# Patient Record
Sex: FEMALE | NOT HISPANIC OR LATINO | Employment: UNEMPLOYED | URBAN - METROPOLITAN AREA
[De-identification: names, ages, dates, MRNs, and addresses within clinical notes are randomized per-mention and may not be internally consistent; named-entity substitution may affect disease eponyms.]

---

## 2025-05-09 ENCOUNTER — APPOINTMENT (OUTPATIENT)
Dept: RADIOLOGY | Facility: HOSPITAL | Age: 60
End: 2025-05-09
Payer: COMMERCIAL

## 2025-05-09 ENCOUNTER — HOSPITAL ENCOUNTER (EMERGENCY)
Facility: HOSPITAL | Age: 60
Discharge: HOME | End: 2025-05-09
Attending: STUDENT IN AN ORGANIZED HEALTH CARE EDUCATION/TRAINING PROGRAM
Payer: COMMERCIAL

## 2025-05-09 VITALS
DIASTOLIC BLOOD PRESSURE: 93 MMHG | TEMPERATURE: 97.9 F | SYSTOLIC BLOOD PRESSURE: 165 MMHG | HEART RATE: 84 BPM | BODY MASS INDEX: 38.64 KG/M2 | RESPIRATION RATE: 14 BRPM | OXYGEN SATURATION: 99 % | WEIGHT: 210 LBS | HEIGHT: 62 IN

## 2025-05-09 DIAGNOSIS — S82.002A CLOSED NONDISPLACED FRACTURE OF LEFT PATELLA, UNSPECIFIED FRACTURE MORPHOLOGY, INITIAL ENCOUNTER: Primary | ICD-10-CM

## 2025-05-09 PROCEDURE — 73502 X-RAY EXAM HIP UNI 2-3 VIEWS: CPT | Mod: RT

## 2025-05-09 PROCEDURE — 73502 X-RAY EXAM HIP UNI 2-3 VIEWS: CPT | Mod: RIGHT SIDE | Performed by: RADIOLOGY

## 2025-05-09 PROCEDURE — 73560 X-RAY EXAM OF KNEE 1 OR 2: CPT | Mod: LT

## 2025-05-09 PROCEDURE — 73560 X-RAY EXAM OF KNEE 1 OR 2: CPT | Mod: RIGHT SIDE | Performed by: RADIOLOGY

## 2025-05-09 PROCEDURE — 2500000001 HC RX 250 WO HCPCS SELF ADMINISTERED DRUGS (ALT 637 FOR MEDICARE OP)

## 2025-05-09 PROCEDURE — 99284 EMERGENCY DEPT VISIT MOD MDM: CPT | Performed by: STUDENT IN AN ORGANIZED HEALTH CARE EDUCATION/TRAINING PROGRAM

## 2025-05-09 PROCEDURE — 73560 X-RAY EXAM OF KNEE 1 OR 2: CPT | Mod: RT

## 2025-05-09 RX ORDER — PANTOPRAZOLE SODIUM 40 MG/1
40 TABLET, DELAYED RELEASE ORAL
COMMUNITY

## 2025-05-09 RX ORDER — CEFADROXIL 500 MG/1
500 CAPSULE ORAL
COMMUNITY

## 2025-05-09 RX ORDER — POLYETHYLENE GLYCOL 3350 17 G/17G
17 POWDER, FOR SOLUTION ORAL DAILY
COMMUNITY

## 2025-05-09 RX ORDER — CITALOPRAM 20 MG/1
20 TABLET, FILM COATED ORAL DAILY
COMMUNITY

## 2025-05-09 RX ORDER — GABAPENTIN 300 MG/1
300 CAPSULE ORAL 2 TIMES DAILY
COMMUNITY

## 2025-05-09 RX ORDER — HYDROCODONE BITARTRATE AND ACETAMINOPHEN 10; 325 MG/1; MG/1
1 TABLET ORAL ONCE
Refills: 0 | Status: COMPLETED | OUTPATIENT
Start: 2025-05-09 | End: 2025-05-09

## 2025-05-09 RX ORDER — DULOXETIN HYDROCHLORIDE 30 MG/1
30 CAPSULE, DELAYED RELEASE ORAL DAILY
COMMUNITY

## 2025-05-09 RX ORDER — CELECOXIB 200 MG/1
200 CAPSULE ORAL 2 TIMES DAILY
COMMUNITY

## 2025-05-09 RX ORDER — DOCUSATE SODIUM 100 MG/1
100 CAPSULE, LIQUID FILLED ORAL DAILY
COMMUNITY

## 2025-05-09 RX ORDER — AMLODIPINE BESYLATE 5 MG/1
5 TABLET ORAL DAILY
COMMUNITY

## 2025-05-09 RX ORDER — VALSARTAN AND HYDROCHLOROTHIAZIDE 160; 25 MG/1; MG/1
1 TABLET ORAL DAILY
COMMUNITY

## 2025-05-09 RX ORDER — OXYCODONE HYDROCHLORIDE 5 MG/1
5 TABLET ORAL EVERY 6 HOURS PRN
COMMUNITY

## 2025-05-09 RX ORDER — VALSARTAN 80 MG/1
80 TABLET ORAL DAILY
COMMUNITY
End: 2025-05-09 | Stop reason: ENTERED-IN-ERROR

## 2025-05-09 RX ORDER — ASPIRIN 81 MG/1
81 TABLET ORAL DAILY
COMMUNITY

## 2025-05-09 RX ORDER — OXYCODONE AND ACETAMINOPHEN 5; 325 MG/1; MG/1
1-2 TABLET ORAL EVERY 6 HOURS PRN
Qty: 15 TABLET | Refills: 0 | Status: SHIPPED | OUTPATIENT
Start: 2025-05-09

## 2025-05-09 RX ORDER — MORPHINE SULFATE 4 MG/ML
4 INJECTION INTRAVENOUS ONCE
Status: DISCONTINUED | OUTPATIENT
Start: 2025-05-09 | End: 2025-05-09

## 2025-05-09 RX ADMIN — HYDROCODONE BITARTRATE AND ACETAMINOPHEN 1 TABLET: 10; 325 TABLET ORAL at 17:03

## 2025-05-09 ASSESSMENT — LIFESTYLE VARIABLES
HAVE YOU EVER FELT YOU SHOULD CUT DOWN ON YOUR DRINKING: NO
HAVE PEOPLE ANNOYED YOU BY CRITICIZING YOUR DRINKING: NO
EVER HAD A DRINK FIRST THING IN THE MORNING TO STEADY YOUR NERVES TO GET RID OF A HANGOVER: NO
EVER FELT BAD OR GUILTY ABOUT YOUR DRINKING: NO
TOTAL SCORE: 0

## 2025-05-09 ASSESSMENT — PAIN - FUNCTIONAL ASSESSMENT: PAIN_FUNCTIONAL_ASSESSMENT: 0-10

## 2025-05-09 ASSESSMENT — PAIN SCALES - GENERAL: PAINLEVEL_OUTOF10: 2

## 2025-05-09 ASSESSMENT — COLUMBIA-SUICIDE SEVERITY RATING SCALE - C-SSRS
2. HAVE YOU ACTUALLY HAD ANY THOUGHTS OF KILLING YOURSELF?: NO
6. HAVE YOU EVER DONE ANYTHING, STARTED TO DO ANYTHING, OR PREPARED TO DO ANYTHING TO END YOUR LIFE?: NO
1. IN THE PAST MONTH, HAVE YOU WISHED YOU WERE DEAD OR WISHED YOU COULD GO TO SLEEP AND NOT WAKE UP?: NO

## 2025-05-09 ASSESSMENT — PAIN DESCRIPTION - PAIN TYPE: TYPE: ACUTE PAIN

## 2025-05-09 ASSESSMENT — PAIN DESCRIPTION - LOCATION: LOCATION: KNEE

## 2025-05-09 NOTE — PROGRESS NOTES
Fina Elliott is a 59 y.o. female admitted for No Principal Problem: There is no principal problem currently on the Problem List. Please update the Problem List and refresh.. Pharmacy reviewed the patient's [id number] medications and allergies for accuracy.    The list below reflects the PTA list prior to pharmacy medication history. A summary a changes to the PTA medication list has been listed below. Please review each medication in order reconciliation for additional clarification and justification.    Source of information:  T2P    Medications added:  ADDED ALL MEDICATIONS    Medications modified:    Medications to be removed:    Medications of concern:      None       Charla Patel

## 2025-05-09 NOTE — DISCHARGE INSTRUCTIONS
Please call Monday morning and expediently follow-up with [facility] for further treatment and evaluation, please ensure the use of the knee immobilizer and your assistive device

## 2025-05-09 NOTE — ED PROVIDER NOTES
Chief Complaint   Patient presents with    left knee pain-replacment in 2019       59-year-old female arrives to the emergency department with a chief complaint of left knee pain with recent right knee replacement.  Patient had left knee replaced in 2019, 4 days ago had the right knee replaced at [facility].  The patient was attempting to ambulate at 2 AM this morning using her walker when spontaneously her left knee went out in her words and the patient fell backwards onto her right hip.  The patient states that since that time she is not able to bear weight on either the right knee with total replacement or the left knee after it has given out.  At rest the patient is in no apparent distress or obvious pain, however with any attempts at weightbearing the pain is unbearable.  Patient is neurovascular intact in bilateral lower extremity, there is postsurgical soft tissue swelling with surgical dressing intact in the right knee.  Bruising and swelling appreciated around the left knee, pain with palpation and range of motion.  Patient also has pain on palpation of the right hip, she does not know if it is from the fall or her favoring it after her right knee replacement.      History provided by:  Patient   used: No         PmHx, PsHx, Allergies, Family Hx, social Hx reviewed as documented    Given the focused nature of the complaint, only related components review of systems were evaluated, and abnormalities indicated in HPI    Physical Exam:    General: Patient is AAOx3, appears well developed, well nourished, is a good historian, answers questions appropriately    HEENT: head normocephalic, atraumatic, PERRLA, EOMs intact, oropharynx without erythema or exudate, buccal mucosa intact without lesions, TMs unremarkable, nose is patent bilateral    Pulmonary: CTAB, no accessory muscle use, able to speak full clear sentences    Cardiac: HRRR, no murmurs, rubs or gallops    GI: soft, non-tender,  non-distended    Musculoskeletal: Postsurgical pain in right knee, acute pain of left knee which is also a total replacement per HPI, pain on palpation with weightbearing of right hip per HPI.  Patient unable to bear weight or ambulate secondary to pain even with assistive device, MOSQUEDA, no joint effusions, clubbing or edema noted    Skin: Postsurgical swelling right knee, soft tissue swelling around left knee per HPI.  Otherwise skin intact, no lesions or rashes noted, turgor is good.    Medical Decision Making  This patient was seen in the emergency department with an attending physician available at all times throughout their ED course    Primary consideration for this patient would be the inability to ambulate, underlying dislocation and/or fracture of the left knee which was a total arthroplasty from 6 years ago approximately, complications secondary to the fall for underlying hip fracture versus contusion, or postsurgical complications of the right knee after the fall.  Bilateral x-rays of the knees as well as right hip will be used to further evaluate, patient denies need for any analgesic at this time.    The patient states that her pain has increased since her stay in the emergency department, patient given Vicodin 10-3 25    The patient's x-ray shows an acute and comminuted displaced fracture of the patella that is acute.  The x-ray of the right hip is negative for any acute abnormality, the x-ray of the right knee shows no acute abnormality, showing gas in the soft tissues likely from the recent surgery.    I consulted Dr. Silva, with on-call orthopedics, he recommended that the patient be placed in a knee immobilizer, ambulated with walker and discharged for outpatient with her established orthopedic at [facility].    The patient is amenable with the plan of discharge, although was offered an admission for placement in a rehab facility, the primary nurse placed the patient in a knee immobilizer on the  left side, the patient able to ambulate slowly with the use of a walker and is requesting to be discharged home and will follow with her established orthopedic physician and lieu of a placement to a rehab facility for inability to ambulate.  I personally reevaluated the patient after the knee immobilizer, the patient was found to be neurovascularly intact distal to the immobilizer.    Patient is nearly out of her Vicodin at home, patient given Percocet for her new fracture of the left patella.    Patient is amenable to the plan of discharge as outlined above, all patient's questions pertaining to their ED course were answered in their entirety.  Strict return precautions were discussed with the patient and they verbalized understanding.  Further, it was made clear to the patient that from an emergent basis, all effort and testing was done to eliminate any imminent dangerous or potentially dangerous conditions of the patient however if their symptoms get much worse or feel life-threatening, they are to return to the emergency department or call 911 immediately.    Amount and/or Complexity of Data Reviewed  Radiology: ordered. Decision-making details documented in ED Course.       Diagnoses as of 05/09/25 1841   Closed nondisplaced fracture of left patella, unspecified fracture morphology, initial encounter       The patient has had the following imaging during this ER visit: XR KNEE RIGHT 1-2 VIEWS  XR KNEE LEFT 1-2 VIEWS  XR HIP RIGHT WITH PELVIS WHEN PERFORMED 2 OR 3 VIEWS     Patient History   Medical History[1]  Surgical History[2]  Family History[3]  Social History[4]    ED Triage Vitals [05/09/25 1426]   Temperature Heart Rate Respirations BP   36.6 °C (97.9 °F) 84 14 (!) 165/93      Pulse Ox Temp src Heart Rate Source Patient Position   99 % -- -- --      BP Location FiO2 (%)     -- --       Vitals:    05/09/25 1426   BP: (!) 165/93   Pulse: 84   Resp: 14   Temp: 36.6 °C (97.9 °F)   SpO2: 99%   Weight: 95.3 kg  "(210 lb)   Height: 1.575 m (5' 2\")               Marvel Kelly, APRN-CNP  05/09/25 1840         [1]   Past Medical History:  Diagnosis Date    Other specified disorders of adrenal gland (Multi) 11/09/2021    Left adrenal mass    Personal history of other diseases of the respiratory system 10/19/2015    History of acute bronchitis   [2]   Past Surgical History:  Procedure Laterality Date    BLADDER SURGERY  11/06/2013    Bladder Surgery    OTHER SURGICAL HISTORY  05/05/2021    Achilles tendon surgery    OTHER SURGICAL HISTORY  05/05/2021    Knee replacement    ROTATOR CUFF REPAIR  11/06/2013    Rotator Cuff Repair    TOTAL ABDOMINAL HYSTERECTOMY W/ BILATERAL SALPINGOOPHORECTOMY  11/06/2013    Total Abdominal Hysterectomy With Removal Of Both Ovaries   [3] No family history on file.  [4]   Social History  Tobacco Use    Smoking status: Not on file    Smokeless tobacco: Not on file   Substance Use Topics    Alcohol use: Not on file    Drug use: Not on file        Marvel Kelly, MARKO-CNP  05/09/25 1841    "

## 2025-12-22 ENCOUNTER — APPOINTMENT (OUTPATIENT)
Dept: PRIMARY CARE | Facility: CLINIC | Age: 60
End: 2025-12-22
Payer: COMMERCIAL